# Patient Record
Sex: FEMALE | Race: WHITE | NOT HISPANIC OR LATINO | Employment: FULL TIME | URBAN - METROPOLITAN AREA
[De-identification: names, ages, dates, MRNs, and addresses within clinical notes are randomized per-mention and may not be internally consistent; named-entity substitution may affect disease eponyms.]

---

## 2023-05-22 RX ORDER — ESCITALOPRAM OXALATE 5 MG/1
5 TABLET ORAL 2 TIMES DAILY
COMMUNITY
End: 2023-06-06 | Stop reason: SDUPTHER

## 2023-05-22 RX ORDER — LISINOPRIL 2.5 MG/1
2.5 TABLET ORAL DAILY
COMMUNITY
End: 2023-06-06 | Stop reason: SDUPTHER

## 2023-05-22 RX ORDER — BUSPIRONE HYDROCHLORIDE 5 MG/1
TABLET ORAL
COMMUNITY
Start: 2021-09-24

## 2023-05-22 RX ORDER — LANOLIN ALCOHOL/MO/W.PET/CERES
1 CREAM (GRAM) TOPICAL DAILY
COMMUNITY
Start: 2021-08-31

## 2023-05-22 RX ORDER — ASPIRIN 325 MG
1 TABLET, DELAYED RELEASE (ENTERIC COATED) ORAL
COMMUNITY
Start: 2021-08-31

## 2023-05-22 RX ORDER — LISINOPRIL 2.5 MG/1
TABLET ORAL
Qty: 30 TABLET | Refills: 0 | OUTPATIENT
Start: 2023-05-22

## 2023-05-26 RX ORDER — ESCITALOPRAM OXALATE 5 MG/1
TABLET ORAL
Qty: 180 TABLET | Refills: 2 | OUTPATIENT
Start: 2023-05-26

## 2023-06-06 ENCOUNTER — OFFICE VISIT (OUTPATIENT)
Dept: PRIMARY CARE | Facility: CLINIC | Age: 48
End: 2023-06-06

## 2023-06-06 DIAGNOSIS — F32.A DEPRESSION, UNSPECIFIED DEPRESSION TYPE: ICD-10-CM

## 2023-06-06 DIAGNOSIS — I10 PRIMARY HYPERTENSION: Primary | ICD-10-CM

## 2023-06-06 PROCEDURE — 3078F DIAST BP <80 MM HG: CPT | Performed by: INTERNAL MEDICINE

## 2023-06-06 PROCEDURE — 99212 OFFICE O/P EST SF 10 MIN: CPT | Performed by: INTERNAL MEDICINE

## 2023-06-06 PROCEDURE — 3075F SYST BP GE 130 - 139MM HG: CPT | Performed by: INTERNAL MEDICINE

## 2023-06-06 PROCEDURE — 1036F TOBACCO NON-USER: CPT | Performed by: INTERNAL MEDICINE

## 2023-06-06 RX ORDER — LISINOPRIL 2.5 MG/1
2.5 TABLET ORAL DAILY
Qty: 90 TABLET | Refills: 2 | Status: SHIPPED | OUTPATIENT
Start: 2023-06-06 | End: 2024-05-21

## 2023-06-06 RX ORDER — SERTRALINE HYDROCHLORIDE 25 MG/1
TABLET, FILM COATED ORAL
COMMUNITY
Start: 2021-08-27 | End: 2023-06-06 | Stop reason: SINTOL

## 2023-06-06 RX ORDER — ESCITALOPRAM OXALATE 5 MG/1
5 TABLET ORAL 2 TIMES DAILY
Qty: 180 TABLET | Refills: 2 | Status: SHIPPED | OUTPATIENT
Start: 2023-06-06 | End: 2023-11-03 | Stop reason: SDUPTHER

## 2023-06-06 ASSESSMENT — ENCOUNTER SYMPTOMS
SLEEP DISTURBANCE: 1
LIGHT-HEADEDNESS: 0
FATIGUE: 1
HEADACHES: 0
SHORTNESS OF BREATH: 0
COUGH: 0

## 2023-06-06 ASSESSMENT — PAIN SCALES - GENERAL: PAINLEVEL: 0-NO PAIN

## 2023-06-06 NOTE — PROGRESS NOTES
Subjective    Ana Cristina Ford is a 47 y.o. female who presents for follow-up.  Chief complaint is insomnia.    HPI    This is a 47 years Old [place] lady with medical H of depression, anxiety, Panic attacks, H of personal injury with loss of phalanges of index and 2 fingers of L hand, Vit D deficiency, Vit B12 deficiency.  Patient  is presented for follow up.  She is in the room with her daughter present.  According to her, patient has been taking Lexapro 5 mg once a day.  Feeling much better.  Stated, that appetite is better, patient has more energy, able to finish her tasks.  Has been having insomnia.  Not interested to start on medications.  Taking melatonin without any significant improvement.  Has been having snoring episodes.  Blood pressure is much better controlled.   Driving.  Able to work.        Review of Systems   Constitutional:  Positive for fatigue.   Respiratory:  Negative for cough and shortness of breath.    Neurological:  Negative for light-headedness and headaches.   Psychiatric/Behavioral:  Positive for sleep disturbance.        Objective        Vitals:    06/06/23 1449   BP: 132/76   Pulse: 62   Resp: 16   Temp: 36.6 °C (97.9 °F)        Physical Exam  Constitutional:       Appearance: Normal appearance.   Eyes:      Extraocular Movements: Extraocular movements intact.   Cardiovascular:      Rate and Rhythm: Normal rate.   Pulmonary:      Effort: Pulmonary effort is normal.   Abdominal:      Palpations: Abdomen is soft.   Musculoskeletal:         General: Normal range of motion.      Cervical back: Normal range of motion.   Skin:     General: Skin is warm.   Neurological:      General: No focal deficit present.      Mental Status: She is alert. Mental status is at baseline.   Psychiatric:         Mood and Affect: Mood normal.         Behavior: Behavior normal.       Diagnoses and all orders for this visit:  Primary hypertension (Primary)  -     lisinopril 2.5 mg tablet; Take 1 tablet (2.5 mg)  by mouth once daily.  Depression, unspecified depression type  -     escitalopram (Lexapro) 5 mg tablet; Take 1 tablet (5 mg) by mouth 2 times a day.           -Insomnia.  Use melatonin.  Trial of Tylenol PM.  Do not use is often.  Consider to have sleep study.    -Panic/ anxiety attacks.  Depression.  Much better controlled.  Continue to take Lexapro  5 mg.    -HTN.   Take lisinopril daily.  Avoid salt, exercise.    -Family H of Breast CA.  Has strong H of Breast CA mother,  at 55 years,   Sister is diagnosed with Breast CA at 54.  Had mammography 1.5 years ago at [facility], stated, had negative results.  Had declined Genetic testing done.    -Health maintenance.  Mammography, fasting Blood work.  Vaccinations.    -H of personal trauma.  Lost L hand phalanges of index and 2 fingers.    -Overweight with BMI 26.43.  Diet, exercise.  Keep BMI is less, then 25.    -Vit B12 deficiency.  Take Vit B12 daily.    - Vit D deficiency.  Take Vit D weekly.         Odalis Rudolph MD

## 2023-06-07 VITALS
WEIGHT: 154 LBS | TEMPERATURE: 97.9 F | BODY MASS INDEX: 26.29 KG/M2 | HEIGHT: 64 IN | RESPIRATION RATE: 16 BRPM | DIASTOLIC BLOOD PRESSURE: 76 MMHG | SYSTOLIC BLOOD PRESSURE: 132 MMHG | HEART RATE: 62 BPM

## 2023-11-03 ENCOUNTER — TELEPHONE (OUTPATIENT)
Dept: PRIMARY CARE | Facility: CLINIC | Age: 48
End: 2023-11-03

## 2023-11-03 DIAGNOSIS — F32.A DEPRESSION, UNSPECIFIED DEPRESSION TYPE: ICD-10-CM

## 2023-11-03 RX ORDER — ESCITALOPRAM OXALATE 5 MG/1
5 TABLET ORAL 2 TIMES DAILY
Qty: 180 TABLET | Refills: 1 | Status: SHIPPED | OUTPATIENT
Start: 2023-11-03 | End: 2024-05-21 | Stop reason: SDUPTHER

## 2024-05-21 ENCOUNTER — OFFICE VISIT (OUTPATIENT)
Dept: PRIMARY CARE | Facility: CLINIC | Age: 49
End: 2024-05-21

## 2024-05-21 VITALS — WEIGHT: 156 LBS | HEIGHT: 64 IN | BODY MASS INDEX: 26.63 KG/M2 | TEMPERATURE: 96.9 F

## 2024-05-21 DIAGNOSIS — F32.A DEPRESSION, UNSPECIFIED DEPRESSION TYPE: ICD-10-CM

## 2024-05-21 PROCEDURE — 99212 OFFICE O/P EST SF 10 MIN: CPT | Performed by: INTERNAL MEDICINE

## 2024-05-21 RX ORDER — ESCITALOPRAM OXALATE 5 MG/1
5 TABLET ORAL 2 TIMES DAILY
Qty: 180 TABLET | Refills: 2 | Status: SHIPPED | OUTPATIENT
Start: 2024-05-21 | End: 2024-08-19

## 2024-05-21 ASSESSMENT — PAIN SCALES - GENERAL: PAINLEVEL: 0-NO PAIN

## 2024-05-21 NOTE — PROGRESS NOTES
Subjective    Ana Cristina Ford is a 48 y.o. female who presents for follow up.  Taking medications, stable.  No issues.    HPI    This is a 48 years Old [place] lady with medical H of depression, anxiety, Panic attacks, H of personal injury with loss of phalanges of index and 2 fingers of L hand, Vit D deficiency, Vit B12 deficiency.  Patient  is presented for follow up.  Taking Escitalopram 5 mg twice a day.  Tolerating well, depression is well controlled.  Has no medical insurance, not interested to have mammography GYN, ophthalmology.  Not interested to have mammography.  Stated, that appetite is better, patient has more energy, able to finish her tasks.     Has been having snoring episodes.  Blood pressure is much better controlled.   Driving.  Able to work.      Review of Systems   Constitutional:  Positive for fatigue. Negative for activity change and appetite change.   Musculoskeletal:  Negative for arthralgias and back pain.   Psychiatric/Behavioral:  The patient is nervous/anxious.        Objective        Vitals:    24 1206   Temp: 36.1 °C (96.9 °F)        Physical Exam  Diagnoses and all orders for this visit:  Depression, unspecified depression type  -     escitalopram (Lexapro) 5 mg tablet; Take 1 tablet (5 mg) by mouth 2 times a day.     Insomnia.  Use melatonin.  Trial of Tylenol PM.  Do not use is often.  Consider to have sleep study.     -Panic/ anxiety attacks.  Depression.  Much better controlled.  Continue to take Lexapro  5 mg.     -HTN.   Take lisinopril daily.  Avoid salt, exercise.     -Family H of Breast CA.  Has strong H of Breast CA mother,  at 55 years,   Sister is diagnosed with Breast CA at 54.  Had mammography 1.5 years ago at [facility], stated, had negative results.  Had declined Genetic testing done.  Has no medical insurance, will postpone further work up.     -Health maintenance.  Mammography, fasting Blood work.  Vaccinations.     -H of personal trauma.  Lost L hand  phalanges of index and 2 fingers.     -Overweight with BMI 26. 78  Diet, exercise.  Keep BMI is less, then 25.     -Vit B12 deficiency.  Take Vit B12 daily.     - Vit D deficiency.  Take Vit D weekly.      Odalis Rudolph MD

## 2024-05-22 ASSESSMENT — ENCOUNTER SYMPTOMS
BACK PAIN: 0
NERVOUS/ANXIOUS: 1
ACTIVITY CHANGE: 0
ARTHRALGIAS: 0
FATIGUE: 1
APPETITE CHANGE: 0

## 2024-06-17 DIAGNOSIS — I10 PRIMARY HYPERTENSION: ICD-10-CM

## 2024-10-01 ENCOUNTER — TELEPHONE (OUTPATIENT)
Dept: PRIMARY CARE | Facility: CLINIC | Age: 49
End: 2024-10-01

## 2024-10-01 DIAGNOSIS — I10 PRIMARY HYPERTENSION: ICD-10-CM

## 2024-10-01 RX ORDER — LISINOPRIL 2.5 MG/1
2.5 TABLET ORAL DAILY
Qty: 90 TABLET | Refills: 2 | Status: SHIPPED | OUTPATIENT
Start: 2024-10-01 | End: 2024-12-30

## 2024-11-11 ENCOUNTER — TELEPHONE (OUTPATIENT)
Dept: PRIMARY CARE | Facility: CLINIC | Age: 49
End: 2024-11-11

## 2024-11-11 DIAGNOSIS — F32.A DEPRESSION, UNSPECIFIED DEPRESSION TYPE: ICD-10-CM

## 2024-11-11 RX ORDER — ESCITALOPRAM OXALATE 5 MG/1
5 TABLET ORAL 2 TIMES DAILY
Qty: 180 TABLET | Refills: 2 | Status: SHIPPED | OUTPATIENT
Start: 2024-11-11 | End: 2025-02-09

## 2025-06-15 DIAGNOSIS — I10 PRIMARY HYPERTENSION: ICD-10-CM

## 2025-06-16 RX ORDER — LISINOPRIL 2.5 MG/1
2.5 TABLET ORAL DAILY
Qty: 90 TABLET | Refills: 2 | Status: SHIPPED | OUTPATIENT
Start: 2025-06-16